# Patient Record
Sex: MALE | Race: WHITE | Employment: UNEMPLOYED | URBAN - NONMETROPOLITAN AREA
[De-identification: names, ages, dates, MRNs, and addresses within clinical notes are randomized per-mention and may not be internally consistent; named-entity substitution may affect disease eponyms.]

---

## 2020-10-13 ENCOUNTER — HOSPITAL ENCOUNTER (EMERGENCY)
Age: 75
Discharge: OTHER INSTITUTION | End: 2020-10-13
Attending: EMERGENCY MEDICINE
Payer: COMMERCIAL

## 2020-10-13 ENCOUNTER — APPOINTMENT (OUTPATIENT)
Dept: CT IMAGING | Age: 75
End: 2020-10-13
Payer: COMMERCIAL

## 2020-10-13 ENCOUNTER — APPOINTMENT (OUTPATIENT)
Dept: GENERAL RADIOLOGY | Age: 75
End: 2020-10-13
Payer: COMMERCIAL

## 2020-10-13 VITALS
RESPIRATION RATE: 15 BRPM | OXYGEN SATURATION: 98 % | BODY MASS INDEX: 24.92 KG/M2 | SYSTOLIC BLOOD PRESSURE: 119 MMHG | HEART RATE: 58 BPM | DIASTOLIC BLOOD PRESSURE: 77 MMHG | HEIGHT: 71 IN | WEIGHT: 178 LBS | TEMPERATURE: 97.4 F

## 2020-10-13 LAB
ALBUMIN SERPL-MCNC: 3.8 G/DL (ref 3.5–5.1)
ALP BLD-CCNC: 89 U/L (ref 38–126)
ALT SERPL-CCNC: 18 U/L (ref 11–66)
ANION GAP SERPL CALCULATED.3IONS-SCNC: 12 MEQ/L (ref 8–16)
APTT: 40.7 SECONDS (ref 22–38)
AST SERPL-CCNC: 21 U/L (ref 5–40)
BASOPHILS # BLD: 0.7 %
BASOPHILS ABSOLUTE: 0.1 THOU/MM3 (ref 0–0.1)
BILIRUB SERPL-MCNC: 0.2 MG/DL (ref 0.3–1.2)
BILIRUBIN URINE: NEGATIVE
BLOOD, URINE: NEGATIVE
BUN BLDV-MCNC: 37 MG/DL (ref 7–22)
CALCIUM SERPL-MCNC: 9.2 MG/DL (ref 8.5–10.5)
CHARACTER, URINE: CLEAR
CHLORIDE BLD-SCNC: 103 MEQ/L (ref 98–111)
CO2: 23 MEQ/L (ref 23–33)
COLOR: YELLOW
CREAT SERPL-MCNC: 1 MG/DL (ref 0.4–1.2)
EKG ATRIAL RATE: 59 BPM
EKG P AXIS: 76 DEGREES
EKG P-R INTERVAL: 196 MS
EKG Q-T INTERVAL: 462 MS
EKG QRS DURATION: 78 MS
EKG QTC CALCULATION (BAZETT): 457 MS
EKG R AXIS: 54 DEGREES
EKG T AXIS: 75 DEGREES
EKG VENTRICULAR RATE: 59 BPM
EOSINOPHIL # BLD: 5.2 %
EOSINOPHILS ABSOLUTE: 0.4 THOU/MM3 (ref 0–0.4)
ERYTHROCYTE [DISTWIDTH] IN BLOOD BY AUTOMATED COUNT: 14.2 % (ref 11.5–14.5)
ERYTHROCYTE [DISTWIDTH] IN BLOOD BY AUTOMATED COUNT: 48.6 FL (ref 35–45)
GFR SERPL CREATININE-BSD FRML MDRD: 73 ML/MIN/1.73M2
GLUCOSE BLD-MCNC: 114 MG/DL (ref 70–108)
GLUCOSE BLD-MCNC: 126 MG/DL (ref 70–108)
GLUCOSE, URINE: NEGATIVE MG/DL
HCT VFR BLD CALC: 37.7 % (ref 42–52)
HEMOGLOBIN: 12.2 GM/DL (ref 14–18)
IMMATURE GRANS (ABS): 0.11 THOU/MM3 (ref 0–0.07)
IMMATURE GRANULOCYTES: 1.3 %
INR BLD: 1.96 (ref 0.85–1.13)
KETONES, URINE: NEGATIVE
LEUKOCYTE EST, POC: NEGATIVE
LYMPHOCYTES # BLD: 33.9 %
LYMPHOCYTES ABSOLUTE: 2.9 THOU/MM3 (ref 1–4.8)
MCH RBC QN AUTO: 30.3 PG (ref 26–33)
MCHC RBC AUTO-ENTMCNC: 32.4 GM/DL (ref 32.2–35.5)
MCV RBC AUTO: 93.5 FL (ref 80–94)
MONOCYTES # BLD: 8.7 %
MONOCYTES ABSOLUTE: 0.7 THOU/MM3 (ref 0.4–1.3)
NITRITE, URINE: NEGATIVE
NUCLEATED RED BLOOD CELLS: 0 /100 WBC
OSMOLALITY CALCULATION: 285.2 MOSMOL/KG (ref 275–300)
PH UA: 5 (ref 5–9)
PLATELET # BLD: 108 THOU/MM3 (ref 130–400)
PMV BLD AUTO: 11.3 FL (ref 9.4–12.4)
POTASSIUM SERPL-SCNC: 4.1 MEQ/L (ref 3.5–5.2)
PROTEIN UA: NEGATIVE MG/DL
RBC # BLD: 4.03 MILL/MM3 (ref 4.7–6.1)
SEG NEUTROPHILS: 50.2 %
SEGMENTED NEUTROPHILS ABSOLUTE COUNT: 4.3 THOU/MM3 (ref 1.8–7.7)
SODIUM BLD-SCNC: 138 MEQ/L (ref 135–145)
SPECIFIC GRAVITY UA: 1.01 (ref 1–1.03)
TOTAL PROTEIN: 7.2 G/DL (ref 6.1–8)
TROPONIN T: < 0.01 NG/ML
UROBILINOGEN, URINE: 0.2 EU/DL (ref 0–1)
WBC # BLD: 8.6 THOU/MM3 (ref 4.8–10.8)

## 2020-10-13 PROCEDURE — 85610 PROTHROMBIN TIME: CPT

## 2020-10-13 PROCEDURE — 85025 COMPLETE CBC W/AUTO DIFF WBC: CPT

## 2020-10-13 PROCEDURE — 85730 THROMBOPLASTIN TIME PARTIAL: CPT

## 2020-10-13 PROCEDURE — 36415 COLL VENOUS BLD VENIPUNCTURE: CPT

## 2020-10-13 PROCEDURE — 80053 COMPREHEN METABOLIC PANEL: CPT

## 2020-10-13 PROCEDURE — 93005 ELECTROCARDIOGRAM TRACING: CPT | Performed by: EMERGENCY MEDICINE

## 2020-10-13 PROCEDURE — 84484 ASSAY OF TROPONIN QUANT: CPT

## 2020-10-13 PROCEDURE — 72125 CT NECK SPINE W/O DYE: CPT

## 2020-10-13 PROCEDURE — 81003 URINALYSIS AUTO W/O SCOPE: CPT

## 2020-10-13 PROCEDURE — 70450 CT HEAD/BRAIN W/O DYE: CPT

## 2020-10-13 PROCEDURE — APPSS180 APP SPLIT SHARED TIME > 60 MINUTES: Performed by: PHYSICIAN ASSISTANT

## 2020-10-13 PROCEDURE — 6820000002 HC L2 INJURY CALL ACTIVATION: Performed by: SURGERY

## 2020-10-13 PROCEDURE — 6370000000 HC RX 637 (ALT 250 FOR IP)

## 2020-10-13 PROCEDURE — 72170 X-RAY EXAM OF PELVIS: CPT

## 2020-10-13 PROCEDURE — 82948 REAGENT STRIP/BLOOD GLUCOSE: CPT

## 2020-10-13 PROCEDURE — 99285 EMERGENCY DEPT VISIT HI MDM: CPT

## 2020-10-13 RX ORDER — POTASSIUM CHLORIDE 7.45 MG/ML
10 INJECTION INTRAVENOUS ONCE
COMMUNITY

## 2020-10-13 RX ORDER — ACETAMINOPHEN 325 MG/1
650 TABLET ORAL EVERY 6 HOURS PRN
COMMUNITY

## 2020-10-13 RX ORDER — CALCIUM CARBONATE 200(500)MG
1 TABLET,CHEWABLE ORAL DAILY
COMMUNITY

## 2020-10-13 RX ORDER — FUROSEMIDE 20 MG/1
20 TABLET ORAL 2 TIMES DAILY
COMMUNITY

## 2020-10-13 RX ORDER — ACETAMINOPHEN 325 MG/1
TABLET ORAL
Status: COMPLETED
Start: 2020-10-13 | End: 2020-10-13

## 2020-10-13 RX ORDER — SOTALOL HYDROCHLORIDE 80 MG/1
80 TABLET ORAL 2 TIMES DAILY
COMMUNITY

## 2020-10-13 RX ORDER — PRAVASTATIN SODIUM 40 MG
40 TABLET ORAL DAILY
COMMUNITY

## 2020-10-13 RX ORDER — ACETAMINOPHEN 325 MG/1
650 TABLET ORAL ONCE
Status: COMPLETED | OUTPATIENT
Start: 2020-10-13 | End: 2020-10-13

## 2020-10-13 RX ORDER — ASPIRIN 81 MG/1
81 TABLET ORAL DAILY
COMMUNITY

## 2020-10-13 RX ADMIN — ACETAMINOPHEN 650 MG: 325 TABLET ORAL at 22:04

## 2020-10-13 ASSESSMENT — ENCOUNTER SYMPTOMS
WHEEZING: 0
ABDOMINAL PAIN: 0
STRIDOR: 0
FACIAL SWELLING: 1
ROS SKIN COMMENTS: RIGHT FOREHEAD ABRASION
NAUSEA: 0
EYE PAIN: 0
SHORTNESS OF BREATH: 0
BACK PAIN: 0
VOMITING: 0

## 2020-10-13 ASSESSMENT — PAIN SCALES - GENERAL: PAINLEVEL_OUTOF10: 4

## 2020-10-13 ASSESSMENT — PAIN DESCRIPTION - LOCATION: LOCATION: HEAD

## 2020-10-14 ASSESSMENT — ENCOUNTER SYMPTOMS
COLOR CHANGE: 0
PHOTOPHOBIA: 0
SORE THROAT: 0
NAUSEA: 0
ABDOMINAL PAIN: 0
COUGH: 0
BACK PAIN: 0
VOMITING: 0
EYE REDNESS: 0
SINUS PRESSURE: 0
CHEST TIGHTNESS: 0

## 2020-10-14 NOTE — ED NOTES
Gamalielib trauma PA at bedside stating CT scans are clear and this RN can take pt C collar off.       Sharad Brenner RN  10/13/20 4746

## 2020-10-14 NOTE — ED NOTES
Bed: 002A  Expected date: 10/13/20  Expected time: 7:53 PM  Means of arrival: [facility]  Comments:     Laura Hwang RN  10/13/20 2003

## 2020-10-14 NOTE — ED NOTES
Called and gave report to [facility] and all questions answered.       Rigo Ferrara RN  10/13/20 8589

## 2020-10-14 NOTE — ED NOTES
Pt stated he needed to pee. This RN and CO able to stand pt with two assist and pt urinated for sample. PT back in bed. No distress noted. RR even and unlabored.       Nataliia Oropeza RN  10/13/20 2126

## 2020-10-14 NOTE — ED NOTES
Pt comes to ED from [facility] [place] via EMS with c/o fall. EMS states pt had an unwitnessed fall. Pt states he was standing. Pt states he does not remember falling. Pt was found on the ground alert. 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 EMS states pt is on blood thinners. CO states pt is in the dementia unit and is dependable. They states pt uses walker. Pt states his had hurts. Pt is alert and oriented to name and SOB. PT is disoriented to time place and situation. CO states that pt uses a walker. Pt obeys commands. Pt has a hematoma and abrasion above his right eye. Pt right eye is non reactive. 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 Upon arrival EMS state pt is lethargic and GCS of 13. EMS states pt is leaning to the left.       Coleman Officer, RN  10/13/20 2057       Coleman Officer, RN  10/13/20 2101

## 2020-10-14 NOTE — ED NOTES
C collar removed and pt sat up. Pt vision is poor.  PT has residual stage of open angle glaucoma and side effects of TBI in his hx      Mitcheal Epley, RN  10/13/20 2112

## 2020-10-14 NOTE — ED NOTES
Pt resting in bed with HOB elevated. VS reassessed. RR reg. PT sates he has a little bit of HA. PT able to make eye contact with this RN. Updated pt on POC. PT verbalized understanding. Pt asked for something to drink.  Will continue to monitor      Carmen Esteves RN  10/13/20 2200

## 2020-10-14 NOTE — ED NOTES
Pt states he has already urinated and wet himself. Pt cleaned up and repositioned in bed while holding c spine.       Pascual Rojas RN  10/13/20 5635

## 2020-10-14 NOTE — CONSULTS
Trauma Surgery Consultation     Patient:  Osmar Vance date: 10/13/2020   YOB: 1945 Date of Evaluation: 10/13/2020  MRN: [id number]  Acct: [de-identified]    Injury Date: 10/13/20  Injury time:Evening  PCP: Giovana Moore MD   Referring physician: Dr. Arlet Stevenson    Time of Trauma Surgeon Notification: 19:52 on 10/13/20  Time of ZACHARY Arrival: 20:00 on 10/13/20  Time of Trauma Surgeon Arrival:  20:00 on 10/13/20    Assessment:    1. Fall  2. Closed head injury  3. Right forehead hematoma and abrasion  4. History of dementia  Plan:    CTs and plain film image reviewed. CT head negative for any acute intracranial hemorrhage. CT cervical spine negative for any acute fracture or dislocation. X-ray of the pelvis negative for any acute fracture or dislocation. FAST exam negative. No LOC reported. Labs reviewed, INR 1.96. Patient did not sustain any acute traumatic injuries that warrants admission under trauma surgery. Patient may be discharged back to snf from a trauma perspective. Discharge per ED physician. Patient seen and discussed with ED physician, Dr. Arlet Stevenson, and trauma surgeon, Dr. Nati Weaver. Activation: []Level I (Trauma Alert) [x]Level II (Injury Call) []Level III (Trauma Consult) [] Downgraded (Time: )   Mode of Arrival: EMS transportation  Referring Facility: none  Loss of Consciousness [x]No []Yes[]Unknown Duration(min)  Mechanism of Injury:  []Motor Vehicle crash   []Single Vehicle [] []Passenger []Scene Fatality []Front Seat  []Restrained   []Air Bag Deployed   []Ejected []Rollover []Pedestrian []Trapped   Type of vehicle:   Protective Devices:   []Motorcycle  Wearing Helmet []Yes []No  []Bicycle  Wearing Helmet []Yes []No  [x]Fall   Distance - standing   []Assault    Abuse Reported []Yes []No  []Gunshot  []Stabbing  []Work Related  []Burn: []Flame []Scald []Electrical []Chemical []Contact []Inhalation []House Fire  []Other:    There is no problem list on file for this patient. Subjective   Chief Complaint: Fall    History of Present Illness: Patient is a 72-year-old male who presents to [address] [place] [address] as an activation of a level 2 trauma following a fall. Patient is inmate at [facility]. Per report from EMS and [facility] guards fall was unwitnessed from standing but no loss of consciousness reported. Per report patient on Coumadin. [facility] guards endorsed a history of dementia and stated patient is acting at his baseline. Upon assessment patient's ABCs were intact, GCS 14, in no acute distress. Patient endorsed having a headache but denied any other pain or complaints. Patient denied having any lightheadedness, dizziness, nausea/vomiting, neck pain, back pain, chest pain, shortness of breath, abdominal pain, pain in the extremities, and paresthesias. On exam patient alert and oriented to self, confusion noted to place and time. Patient noted to have hematoma with overlying abrasions noted on the right side of his forehead above his right eye. Right pupil with irregular shape and black portion noted to superior aspect of right iris, left equal and reactive to light. Patient follows commands and moves all extremities. Distal pulses intact. Chest and abdomen nontender. FAST exam negative. C-collar in place. Patient with midline cervical tenderness to palpation. No midline thoracic or lumbar tenderness to palpation. No tenderness to palpation noted throughout extremities. Patient was stable and taken radiology for CT imaging. CTs and plain film image reviewed. CT head negative for any acute intracranial hemorrhage. CT cervical spine negative for any acute fracture or dislocation. X-ray of the pelvis negative for any acute fracture or dislocation. CT head did note 3mm calcification vs foreign body within subcutaneous tissues in location of right forehead hematoma and abrasion.  Wound reassessed, no foreign body palpated or visualized in Financial resource strain: Not on file    Food insecurity     Worry: Not on file     Inability: Not on file    Transportation needs     Medical: Not on file     Non-medical: Not on file   Tobacco Use    Smoking status: Not on file   Substance and Sexual Activity    Alcohol use: Not on file    Drug use: Not on file    Sexual activity: Not on file   Lifestyle    Physical activity     Days per week: Not on file     Minutes per session: Not on file    Stress: Not on file   Relationships    Social connections     Talks on phone: Not on file     Gets together: Not on file     Attends Zoroastrian service: Not on file     Active member of club or organization: Not on file     Attends meetings of clubs or organizations: Not on file     Relationship status: Not on file    Intimate partner violence     Fear of current or ex partner: Not on file     Emotionally abused: Not on file     Physically abused: Not on file     Forced sexual activity: Not on file   Other Topics Concern    Not on file   Social History Narrative    Not on file     History reviewed. No pertinent family history.     Home medications:    Previous Medications    ACETAMINOPHEN (TYLENOL) 325 MG TABLET    Take 650 mg by mouth every 6 hours as needed for Pain    ALBUTEROL IN    Inhale into the lungs    ASPIRIN 81 MG EC TABLET    Take 81 mg by mouth daily    CALCIUM CARBONATE (TUMS) 500 MG CHEWABLE TABLET    Take 1 tablet by mouth daily    FLUTICASONE-SALMETEROL (ADVAIR) 250-50 MCG/DOSE AEPB    Inhale 1 puff into the lungs every 12 hours    FUROSEMIDE (LASIX) 20 MG TABLET    Take 20 mg by mouth 2 times daily    METOPROLOL TARTRATE (LOPRESSOR) 25 MG TABLET    Take 25 mg by mouth 2 times daily    POTASSIUM CHLORIDE 10 MEQ/100ML    Infuse 10 mEq intravenously once    PRAVASTATIN (PRAVACHOL) 40 MG TABLET    Take 40 mg by mouth daily    SOTALOL (BETAPACE) 80 MG TABLET    Take 80 mg by mouth 2 times daily    WARFARIN SODIUM (COUMADIN PO)    Take by mouth Hospital medications:  Scheduled Meds:  Continuous Infusions:  PRN Meds:  Objective   ED TRIAGE VITALS  BP: 134/83, Temp: 97.4 °F (36.3 °C), Pulse: 57, Resp: 13, SpO2: 100 %  [place] Coma Scale  Eye Opening: Spontaneous  Best Verbal Response: Confused  Best Motor Response: Obeys commands  [place] Coma Scale Score: 14  No results found for this visit on 10/13/20. Physical Exam:  Patient Vitals for the past 24 hrs:   BP Temp Pulse Resp SpO2 Height Weight   10/13/20 2007 -- -- -- -- -- 5' 11\" (1.803 m) 178 lb (80.7 kg)   10/13/20 2006 -- 97.4 °F (36.3 °C) -- -- -- -- --   10/13/20 2004 134/83 -- 57 13 100 % -- --     Primary Assessment:  Airway: Patent, trachea midline  Breathing: Breath sounds present and equal bilaterally, spontaneous, and unlabored  Circulation: Hemodynamically stable, 2+ central and peripheral pulses. Disability: LORENZO x 4, following commands. GCS =14 (E4,V4,M6)    Secondary Assessment:  General: Alert, NAD, confusion noted, C-collar in place, cooperative with exam  Head: Normocephalic, mid face stable, Nares patent bilaterally, no epistaxis. Mouth clear of foreign bodies, no lacerations or abrasions. Hematoma and overlying abrasion noted to right side of forehead above right eye  Eyes: EOMI, Nontraumatic, Right pupil with irregular shape and black portion noted to superior aspect of right iris, left equal and reactive to light. Neurologic: A & O x1. Alert to self, confusion noted to place and time. Follows commands. CN 2-12 grossly intact. Neck: Immobilized in cervical collar, trachea midline. Cervical spines are tender to palpation midline, without step-offs, crepitus or deformity. Back:TL spines are NTTP midline, without step-offs, crepitus or deformity. No abrasions, contusions, or ecchymosis noted. Lungs: Clear to auscultation bilaterally. Chest Wall: Chest rise symmetrical.  Chest wall without tenderness to palpation. No crepitus, deformities, lacerations, or abrasions. Heart: RRR. Normal S1/S2. No obvious M/G/R. Abdomen:  Soft, NTTP. No guarding. Non-peritoneal.  Pelvis:  NTTP, stable to compression. Extremities: No gross deformities. PMS intact. Radial /DP/PT pulses 2+ bilaterally. No tenderness to palpation noted throughout extremities. Moves all extremities. Skin: Skin warm and dry. Normal for ethnicity. Radiology:     XR PELVIS (1-2 VIEWS)   Final Result   1. No acute findings. This document has been electronically signed by: Hollie Salinas MD on    10/13/2020 09:07 PM         CT HEAD WO CONTRAST   Final Result   No acute intracranial findings. Right frontal scalp hematoma. **This report has been created using voice recognition software. It may contain minor errors which are inherent in voice recognition technology. **      Final report electronically signed by Dr. Lashaun Cortez on 10/13/2020 8:35 PM      CT CERVICAL SPINE WO CONTRAST   Final Result    IMPRESSION:   No acute fracture or subluxation. **This report has been created using voice recognition software. It may contain minor errors which are inherent in voice recognition technology. **      Final report electronically signed by Dr. Lashaun Cortez on 10/13/2020 8:41 PM        Fast Exam: Yes    FAST EXAM:  A limited, bedside FAST exam was performed. The medical necessity was to evaluate for the presence or absence of intraperitoneal or pericardial fluid. The structures studied were the hepatorenal space, splenorenal space, pericardium, and bladder. FINDINGS:  negative for free intra-abdominal fluid. The study was technically adequate. FAST performed by medical student under supervision of myself and Dr. Su Gracia    Electronically signed by Devin Martinez PA-C on 10/13/2020 at 8:12 PM Patient seen and examined independently by me. Above discussed and I agree with CNP. Labs, cultures, and radiographs where available were reviewed.    See orders for the updated patient care Regino Aase MD, this was a level 2 trauma consult time called was 7:53 PM time seen was 8:00 PM this was a trauma by fall on anticoagulants 47-year-old white male prisoner at [address] on Coumadin also apparently with baseline dementia who had an unwitnessed fall patient was awake but did not know what it happened to him when asked if he knew where he was he said no however neurologically he appeared intact his only obvious injury was an abrasion hematoma to the right superior orbital area patient's abdomen was soft with no other evidence of injuries well-healed midline incisions and right paramedian incision lower quadrant were noted work-up with CT scans of the head and neck were negative okay for patient to be discharged back to the present  10/13/2020   11:25 PM

## 2020-10-15 NOTE — ED PROVIDER NOTES
[address] [address]          Pt Name: Jessica Aponte  MRN: [id number]  [place] 1945  Date of evaluation: 10/13/2020  Emergency Physician: Savanah Carlisle DO    CHIEF COMPLAINT       Chief Complaint   Patient presents with    Fall     History obtained from unobtainable from patient due to dementia. HISTORY OF PRESENT ILLNESS    HPI  Jessica Aponte is a 70 y.o. male who presents to the emergency department for evaluation of fall. Patient was a level 2 trauma activation per EMS criteria. Patient is an unknown at [facility]. Per the retirement guards patient had an unwitnessed fall from standing no loss of consciousness. He was noted to have a cephalohematoma. And a frontal abrasion. Aj Lea He was on Coumadin. Patient has a history of dementia and is acting his baseline per report from guards. Patient does not recall events of his fall he states he fell. Chest pain denies shortness of breath denies nausea denies vomiting. Currently he reports mild headache. The patient has no other acute complaints at this time. REVIEW OF SYSTEMS   Review of Systems   Unable to perform ROS: Dementia   Constitutional: Negative for activity change, chills and fever. HENT: Negative for congestion, sinus pressure and sore throat. Eyes: Negative for photophobia, redness and visual disturbance. Respiratory: Negative for cough and chest tightness. Cardiovascular: Negative for chest pain, palpitations and leg swelling. Gastrointestinal: Negative for abdominal pain, nausea and vomiting. Endocrine: Negative for polydipsia and polyuria. Genitourinary: Negative for decreased urine volume, difficulty urinating, dysuria, flank pain, frequency and urgency. Musculoskeletal: Negative for back pain, myalgias and neck pain. Skin: Negative for color change and rash. Neurological: Negative for weakness and headaches.    Hematological: Negative for adenopathy. Does not bruise/bleed easily. Psychiatric/Behavioral: Negative for confusion and self-injury. PAST MEDICAL AND SURGICAL HISTORY     Past Medical History:   Diagnosis Date    Asthma     Atrial fibrillation (HCC)     Cellulitis     Chronic airway obstruction (HCC)     Diverticulosis     Fibromatosis     Glaucoma     Hip joint pain     Hyperlipidemia     Hypertension     Osteoarthritis     TBI (traumatic brain injury) ([facility] [address]     Tinea corporis      History reviewed. No pertinent surgical history. MEDICATIONS   No current facility-administered medications for this encounter.      Current Outpatient Medications:     calcium carbonate (TUMS) 500 MG chewable tablet, Take 1 tablet by mouth daily, Disp: , Rfl:     ALBUTEROL IN, Inhale into the lungs, Disp: , Rfl:     acetaminophen (TYLENOL) 325 MG tablet, Take 650 mg by mouth every 6 hours as needed for Pain, Disp: , Rfl:     furosemide (LASIX) 20 MG tablet, Take 20 mg by mouth 2 times daily, Disp: , Rfl:     metoprolol tartrate (LOPRESSOR) 25 MG tablet, Take 25 mg by mouth 2 times daily, Disp: , Rfl:     aspirin 81 MG EC tablet, Take 81 mg by mouth daily, Disp: , Rfl:     pravastatin (PRAVACHOL) 40 MG tablet, Take 40 mg by mouth daily, Disp: , Rfl:     sotalol (BETAPACE) 80 MG tablet, Take 80 mg by mouth 2 times daily, Disp: , Rfl:     potassium chloride 10 MEQ/100ML, Infuse 10 mEq intravenously once, Disp: , Rfl:     fluticasone-salmeterol (ADVAIR) 250-50 MCG/DOSE AEPB, Inhale 1 puff into the lungs every 12 hours, Disp: , Rfl:     Warfarin Sodium (COUMADIN PO), Take by mouth, Disp: , Rfl:       SOCIAL HISTORY     Social History     Social History Narrative    Not on file     Social History     Tobacco Use    Smoking status: Former Smoker    Smokeless tobacco: Never Used   Substance Use Topics    Alcohol use: Not Currently    Drug use: Never         ALLERGIES   No Known Allergies      FAMILY HISTORY History reviewed. No pertinent family history. PHYSICAL EXAM     ED Triage Vitals   BP Temp Temp src Pulse Resp SpO2 Height Weight   10/13/20 2004 10/13/20 2006 -- 10/13/20 2004 10/13/20 2004 10/13/20 2004 10/13/20 2007 10/13/20 2007   134/83 97.4 °F (36.3 °C)  57 13 100 % 5' 11\" (1.803 m) 178 lb (80.7 kg)         Additional Vital Signs:  Vitals:    10/13/20 2243   BP: 119/77   Pulse: 58   Resp: 15   Temp:    SpO2: 98%       Physical Exam  Vitals signs and nursing note reviewed. Constitutional:       General: He is not in acute distress. Appearance: He is well-developed. He is not diaphoretic. HENT:      Head: Normocephalic. Comments: Right frontal cephalohematoma with abrasion     Right Ear: Tympanic membrane and ear canal normal.      Left Ear: Tympanic membrane and ear canal normal.      Mouth/Throat:      Mouth: Mucous membranes are dry. Eyes:      Extraocular Movements: Extraocular movements intact. Pupils: Pupils are equal, round, and reactive to light. Comments: Right pupil with chronic iridotomy scar   Neck:      Musculoskeletal: Normal range of motion and neck supple. Vascular: No JVD. Cardiovascular:      Rate and Rhythm: Normal rate and regular rhythm. Heart sounds: Normal heart sounds. Pulmonary:      Effort: Pulmonary effort is normal.      Breath sounds: Normal breath sounds. Abdominal:      General: Bowel sounds are normal. There is no distension. Palpations: Abdomen is soft. Tenderness: There is no abdominal tenderness. Musculoskeletal: Normal range of motion. General: No tenderness or deformity. Skin:     General: Skin is warm and dry. Capillary Refill: Capillary refill takes less than 2 seconds. Neurological:      Mental Status: He is alert and oriented to person, place, and time. GCS: GCS eye subscore is 4. GCS verbal subscore is 5. GCS motor subscore is 6. Cranial Nerves: No cranial nerve deficit.       Sensory: findings. This document has been electronically signed by: Eduardo Rashid MD on    10/13/2020 09:07 PM         CT HEAD WO CONTRAST   Final Result   No acute intracranial findings. Right frontal scalp hematoma. **This report has been created using voice recognition software. It may contain minor errors which are inherent in voice recognition technology. **      Final report electronically signed by Dr. Nilay Atwood on 10/13/2020 8:35 PM      CT CERVICAL SPINE WO CONTRAST   Final Result    IMPRESSION:   No acute fracture or subluxation. **This report has been created using voice recognition software. It may contain minor errors which are inherent in voice recognition technology. **      Final report electronically signed by Dr. Nilay Atwood on 10/13/2020 8:41 PM          ED Medications administered this visit:   Medications   acetaminophen (TYLENOL) tablet 650 mg (650 mg Oral Given 10/13/20 2204)         ED COURSE      Patient was seen in consultation with trauma services. He was found to have an isolated cephalhematoma and abrasion. CT imaging of head and neck were negative. Pelvis x-ray was negative for fracture. Patient remains afebrile with stable vital signs. Blood counts are within acceptable range as well are his his troponin and ECG. He is discharged back to the care home in stable condition. Given head injury return precautions        MEDICATION CHANGES     DISCHARGE MEDICATIONS:  Discharge Medication List as of 10/13/2020 10:13 PM               FINAL DISPOSITION     Final diagnoses:   Injury of head, initial encounter   Fall, initial encounter   Facial abrasion, initial encounter     Condition: condition: good  Dispo: Discharge to care home    PATIENT REFERRED TO:  SCHUYLER Wilson - CNP  [address]  [address]  [phone]    Schedule an appointment as soon as possible for a visit in 2 days          This transcription was electronically signed.  Parts of this transcriptions may have been dictated by use of voice recognition software and electronically transcribed, and parts may have been transcribed with the assistance of an ED scribe. The transcription may contain errors not detected in proofreading.     Electronically Signed: Mark Patel, 10/14/20, 9:06 PM      Mark Patel DO  10/14/20 0406

## 2020-11-11 ENCOUNTER — HOSPITAL ENCOUNTER (EMERGENCY)
Age: 75
Discharge: ANOTHER HOSPITAL | End: 2020-11-11
Payer: COMMERCIAL

## 2020-11-11 ENCOUNTER — APPOINTMENT (OUTPATIENT)
Dept: GENERAL RADIOLOGY | Age: 75
End: 2020-11-11
Payer: COMMERCIAL

## 2020-11-11 VITALS
SYSTOLIC BLOOD PRESSURE: 96 MMHG | HEART RATE: 72 BPM | RESPIRATION RATE: 18 BRPM | BODY MASS INDEX: 25.1 KG/M2 | OXYGEN SATURATION: 96 % | TEMPERATURE: 99.8 F | DIASTOLIC BLOOD PRESSURE: 59 MMHG | WEIGHT: 180 LBS

## 2020-11-11 LAB
ALBUMIN SERPL-MCNC: 3.5 G/DL (ref 3.5–5.1)
ALP BLD-CCNC: 89 U/L (ref 38–126)
ALT SERPL-CCNC: 29 U/L (ref 11–66)
ANION GAP SERPL CALCULATED.3IONS-SCNC: 14 MEQ/L (ref 8–16)
AST SERPL-CCNC: 81 U/L (ref 5–40)
ATYPICAL LYMPHOCYTES: ABNORMAL %
BASE EXCESS MIXED: 0 MMOL/L (ref -2–3)
BASOPHILS # BLD: 0.1 %
BASOPHILS # BLD: 0.3 %
BASOPHILS ABSOLUTE: 0 THOU/MM3 (ref 0–0.1)
BASOPHILS ABSOLUTE: 0 THOU/MM3 (ref 0–0.1)
BILIRUB SERPL-MCNC: 0.4 MG/DL (ref 0.3–1.2)
BILIRUBIN DIRECT: < 0.2 MG/DL (ref 0–0.3)
BUN BLDV-MCNC: 57 MG/DL (ref 7–22)
C-REACTIVE PROTEIN: 8.53 MG/DL (ref 0–1)
CALCIUM SERPL-MCNC: 9.2 MG/DL (ref 8.5–10.5)
CHLORIDE BLD-SCNC: 106 MEQ/L (ref 98–111)
CO2: 26 MEQ/L (ref 23–33)
COLLECTED BY:: ABNORMAL
CREAT SERPL-MCNC: 1.4 MG/DL (ref 0.4–1.2)
DEVICE: ABNORMAL
EKG ATRIAL RATE: 78 BPM
EKG P AXIS: 63 DEGREES
EKG P-R INTERVAL: 150 MS
EKG Q-T INTERVAL: 400 MS
EKG QRS DURATION: 70 MS
EKG QTC CALCULATION (BAZETT): 456 MS
EKG R AXIS: 47 DEGREES
EKG T AXIS: 56 DEGREES
EKG VENTRICULAR RATE: 78 BPM
EOSINOPHIL # BLD: 0 %
EOSINOPHIL # BLD: 0 %
EOSINOPHILS ABSOLUTE: 0 THOU/MM3 (ref 0–0.4)
EOSINOPHILS ABSOLUTE: 0 THOU/MM3 (ref 0–0.4)
ERYTHROCYTE [DISTWIDTH] IN BLOOD BY AUTOMATED COUNT: 14.6 % (ref 11.5–14.5)
ERYTHROCYTE [DISTWIDTH] IN BLOOD BY AUTOMATED COUNT: 14.6 % (ref 11.5–14.5)
ERYTHROCYTE [DISTWIDTH] IN BLOOD BY AUTOMATED COUNT: 50.3 FL (ref 35–45)
ERYTHROCYTE [DISTWIDTH] IN BLOOD BY AUTOMATED COUNT: 50.5 FL (ref 35–45)
FERRITIN: 1141 NG/ML (ref 22–322)
FIO2, MIXED VENOUS: 2
GFR SERPL CREATININE-BSD FRML MDRD: 50 ML/MIN/1.73M2
GLUCOSE BLD-MCNC: 124 MG/DL (ref 70–108)
HCO3, MIXED: 24 MMOL/L (ref 23–28)
HCT VFR BLD CALC: 46.2 % (ref 42–52)
HCT VFR BLD CALC: 47.2 % (ref 42–52)
HEMOGLOBIN: 14.6 GM/DL (ref 14–18)
HEMOGLOBIN: 15.2 GM/DL (ref 14–18)
IMMATURE GRANS (ABS): 0.02 THOU/MM3 (ref 0–0.07)
IMMATURE GRANS (ABS): 0.04 THOU/MM3 (ref 0–0.07)
IMMATURE GRANULOCYTES: 0.3 %
IMMATURE GRANULOCYTES: 0.6 %
INR BLD: 2.88 (ref 0.85–1.13)
INR BLD: 3 (ref 0.85–1.13)
LACTIC ACID: 1.4 MMOL/L (ref 0.5–2.2)
LD: 442 U/L (ref 100–190)
LIPASE: 183.9 U/L (ref 5.6–51.3)
LYMPHOCYTES # BLD: 31.3 %
LYMPHOCYTES # BLD: 33 %
LYMPHOCYTES ABSOLUTE: 2.4 THOU/MM3 (ref 1–4.8)
LYMPHOCYTES ABSOLUTE: 2.4 THOU/MM3 (ref 1–4.8)
MCH RBC QN AUTO: 29.9 PG (ref 26–33)
MCH RBC QN AUTO: 30 PG (ref 26–33)
MCHC RBC AUTO-ENTMCNC: 31.6 GM/DL (ref 32.2–35.5)
MCHC RBC AUTO-ENTMCNC: 32.2 GM/DL (ref 32.2–35.5)
MCV RBC AUTO: 93.1 FL (ref 80–94)
MCV RBC AUTO: 94.5 FL (ref 80–94)
MONOCYTES # BLD: 7 %
MONOCYTES # BLD: 7.3 %
MONOCYTES ABSOLUTE: 0.5 THOU/MM3 (ref 0.4–1.3)
MONOCYTES ABSOLUTE: 0.5 THOU/MM3 (ref 0.4–1.3)
NUCLEATED RED BLOOD CELLS: 0 /100 WBC
NUCLEATED RED BLOOD CELLS: 0 /100 WBC
O2 SAT, MIXED: 52 %
OSMOLALITY CALCULATION: 307.8 MOSMOL/KG (ref 275–300)
PCO2, MIXED VENOUS: 38 MMHG (ref 41–51)
PH, MIXED: 7.42 (ref 7.31–7.41)
PLATELET # BLD: 94 THOU/MM3 (ref 130–400)
PLATELET # BLD: 98 THOU/MM3 (ref 130–400)
PLATELET ESTIMATE: ABNORMAL
PMV BLD AUTO: 11.2 FL (ref 9.4–12.4)
PMV BLD AUTO: 12 FL (ref 9.4–12.4)
PO2 MIXED: 27 MMHG (ref 25–40)
POTASSIUM REFLEX MAGNESIUM: 4.1 MEQ/L (ref 3.5–5.2)
PRO-BNP: 56.8 PG/ML (ref 0–900)
RBC # BLD: 4.89 MILL/MM3 (ref 4.7–6.1)
RBC # BLD: 5.07 MILL/MM3 (ref 4.7–6.1)
REASON FOR REJECTION: NORMAL
REJECTED TEST: NORMAL
SARS-COV-2, NAAT: DETECTED
SCAN OF BLOOD SMEAR: NORMAL
SEG NEUTROPHILS: 59 %
SEG NEUTROPHILS: 61.1 %
SEGMENTED NEUTROPHILS ABSOLUTE COUNT: 4.3 THOU/MM3 (ref 1.8–7.7)
SEGMENTED NEUTROPHILS ABSOLUTE COUNT: 4.6 THOU/MM3 (ref 1.8–7.7)
SODIUM BLD-SCNC: 146 MEQ/L (ref 135–145)
TOTAL PROTEIN: 7.3 G/DL (ref 6.1–8)
TROPONIN T: < 0.01 NG/ML
WBC # BLD: 7.3 THOU/MM3 (ref 4.8–10.8)
WBC # BLD: 7.6 THOU/MM3 (ref 4.8–10.8)

## 2020-11-11 PROCEDURE — 86140 C-REACTIVE PROTEIN: CPT

## 2020-11-11 PROCEDURE — 2700000000 HC OXYGEN THERAPY PER DAY

## 2020-11-11 PROCEDURE — 83690 ASSAY OF LIPASE: CPT

## 2020-11-11 PROCEDURE — 85025 COMPLETE CBC W/AUTO DIFF WBC: CPT

## 2020-11-11 PROCEDURE — 80076 HEPATIC FUNCTION PANEL: CPT

## 2020-11-11 PROCEDURE — 71045 X-RAY EXAM CHEST 1 VIEW: CPT

## 2020-11-11 PROCEDURE — 93010 ELECTROCARDIOGRAM REPORT: CPT | Performed by: NUCLEAR MEDICINE

## 2020-11-11 PROCEDURE — U0002 COVID-19 LAB TEST NON-CDC: HCPCS

## 2020-11-11 PROCEDURE — 83615 LACTATE (LD) (LDH) ENZYME: CPT

## 2020-11-11 PROCEDURE — 94761 N-INVAS EAR/PLS OXIMETRY MLT: CPT

## 2020-11-11 PROCEDURE — 93005 ELECTROCARDIOGRAM TRACING: CPT | Performed by: NURSE PRACTITIONER

## 2020-11-11 PROCEDURE — 80048 BASIC METABOLIC PNL TOTAL CA: CPT

## 2020-11-11 PROCEDURE — 36415 COLL VENOUS BLD VENIPUNCTURE: CPT

## 2020-11-11 PROCEDURE — 83880 ASSAY OF NATRIURETIC PEPTIDE: CPT

## 2020-11-11 PROCEDURE — 83605 ASSAY OF LACTIC ACID: CPT

## 2020-11-11 PROCEDURE — 84484 ASSAY OF TROPONIN QUANT: CPT

## 2020-11-11 PROCEDURE — 82728 ASSAY OF FERRITIN: CPT

## 2020-11-11 PROCEDURE — 6370000000 HC RX 637 (ALT 250 FOR IP): Performed by: NURSE PRACTITIONER

## 2020-11-11 PROCEDURE — 85610 PROTHROMBIN TIME: CPT

## 2020-11-11 PROCEDURE — 99285 EMERGENCY DEPT VISIT HI MDM: CPT

## 2020-11-11 PROCEDURE — 82803 BLOOD GASES ANY COMBINATION: CPT

## 2020-11-11 PROCEDURE — 2580000003 HC RX 258: Performed by: NURSE PRACTITIONER

## 2020-11-11 RX ORDER — SODIUM CHLORIDE 9 MG/ML
1000 INJECTION, SOLUTION INTRAVENOUS CONTINUOUS
Status: DISCONTINUED | OUTPATIENT
Start: 2020-11-11 | End: 2020-11-12 | Stop reason: HOSPADM

## 2020-11-11 RX ORDER — ACETAMINOPHEN 500 MG
1000 TABLET ORAL ONCE
Status: COMPLETED | OUTPATIENT
Start: 2020-11-11 | End: 2020-11-11

## 2020-11-11 RX ORDER — ACETAMINOPHEN 500 MG
TABLET ORAL
Status: DISCONTINUED
Start: 2020-11-11 | End: 2020-11-12 | Stop reason: HOSPADM

## 2020-11-11 RX ADMIN — SODIUM CHLORIDE 1000 ML: 9 INJECTION, SOLUTION INTRAVENOUS at 19:52

## 2020-11-11 RX ADMIN — ACETAMINOPHEN 1000 MG: 500 TABLET ORAL at 16:09

## 2020-11-11 ASSESSMENT — PAIN SCALES - GENERAL: PAINLEVEL_OUTOF10: 0

## 2020-11-11 NOTE — ED PROVIDER NOTES
potassium chloride 10 MEQ/100ML Infuse 10 mEq intravenously onceHistorical Med      fluticasone-salmeterol (ADVAIR) 250-50 MCG/DOSE AEPB Inhale 1 puff into the lungs every 12 hoursHistorical Med      Warfarin Sodium (COUMADIN PO) Take by mouth             ALLERGIES     has No Known Allergies. FAMILY HISTORY     has no family status information on file. family history is not on file. SOCIAL HISTORY      reports that he has quit smoking. He has never used smokeless tobacco. He reports previous alcohol use. He reports that he does not use drugs. PHYSICAL EXAM     INITIAL VITALS:  weight is 180 lb (81.6 kg). His oral temperature is 99.8 °F (37.7 °C). His blood pressure is 96/59 (abnormal) and his pulse is 72. His respiration is 18 and oxygen saturation is 96%. Physical Exam  Vitals signs and nursing note reviewed. Constitutional:       General: He is awake. He is not in acute distress. Appearance: Normal appearance. He is well-developed and normal weight. He is ill-appearing and diaphoretic. He is not toxic-appearing. HENT:      Head: Normocephalic and atraumatic. Nose: Nose normal.      Mouth/Throat:      Mouth: Mucous membranes are moist.      Pharynx: Oropharynx is clear. Eyes:      Extraocular Movements: Extraocular movements intact. Pupils: Pupils are equal, round, and reactive to light. Neck:      Musculoskeletal: Normal range of motion and neck supple. No neck rigidity or muscular tenderness. Cardiovascular:      Rate and Rhythm: Normal rate and regular rhythm. No extrasystoles are present. Pulses: Normal pulses. Heart sounds: Normal heart sounds, S1 normal and S2 normal. Heart sounds not distant. No murmur. No friction rub. No gallop. Pulmonary:      Effort: Pulmonary effort is normal. No tachypnea, bradypnea, accessory muscle usage, prolonged expiration, respiratory distress or retractions. Breath sounds: Normal breath sounds. No stridor.  No wheezing, rhonchi or rales. Chest:      Chest wall: No tenderness. Abdominal:      General: Abdomen is flat. Bowel sounds are normal. There is no distension. Palpations: Abdomen is soft. There is no shifting dullness, hepatomegaly, splenomegaly or mass. Tenderness: There is no abdominal tenderness. Hernia: No hernia is present. Musculoskeletal: Normal range of motion. General: No swelling, tenderness, deformity or signs of injury. Right lower leg: No edema. Left lower leg: No edema. Skin:     General: Skin is warm. Capillary Refill: Capillary refill takes less than 2 seconds. Coloration: Skin is not jaundiced or pale. Neurological:      General: No focal deficit present. Mental Status: He is alert and oriented to person, place, and time. Mental status is at baseline. GCS: GCS eye subscore is 4. GCS verbal subscore is 5. GCS motor subscore is 6. Cranial Nerves: Cranial nerves are intact. Sensory: Sensation is intact. Psychiatric:         Mood and Affect: Mood normal.         Behavior: Behavior normal. Behavior is cooperative. DIFFERENTIAL DIAGNOSIS:   COVID-19 infection, pneumonia, hypoxia, hypercapnia, sepsis    DIAGNOSTIC RESULTS     EKG: All EKG's are interpreted by the Emergency Department Physician who either signs or Co-signs this chart in the absence of a cardiologist.    Sinus rhythm with rate of 78, , QRS of 70, QTc 456. Compared with EKG from October 13, 2020 PACs now present    EKG interpreted by ED physician  RADIOLOGY: non-plainfilm images(s) such as CT, Ultrasound and MRI are read by the radiologist.    XR CHEST PORTABLE   Final Result   1. Moderate cardiomegaly. No effusion. 2. Moderate groundglass infiltrates scattered diffusely in the right lung and the left middle lower lung fields, consistent with Covid infection/pneumonia. **This report has been created using voice recognition software.   It may the following components:    SARS-CoV-2, NAAT DETECTED (*)     All other components within normal limits   GLOMERULAR FILTRATION RATE, ESTIMATED - Abnormal; Notable for the following components:    Est, Glom Filt Rate 50 (*)     All other components within normal limits   OSMOLALITY - Abnormal; Notable for the following components:    Osmolality Calc 307.8 (*)     All other components within normal limits   TROPONIN   BRAIN NATRIURETIC PEPTIDE   LACTIC ACID, PLASMA   SPECIMEN REJECTION   SCAN OF BLOOD SMEAR   ANION GAP   COVID-19   COVID-19       EMERGENCY DEPARTMENT COURSE:   Vitals:    Vitals:    11/11/20 2055 11/11/20 2215 11/11/20 2300 11/11/20 2350   BP: 121/84 111/69 104/69 (!) 96/59   Pulse: 85 78 75 72   Resp: 20 18 18 18   Temp:       TempSrc:       SpO2: 95% 96% 95% 96%   Weight:           3:55 PM EST: The patient was seen and evaluated. MDM:  Patient seen and evaluated today for hypoxia at present. 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 Appropriate labs were ordered, patient found to be hypoxic on arrival.  COVID-19 swab found to be positive. Patient required supplemental oxygen to maintain saturations. Vital signs noted to improve throughout ED stay. Patient noted to be mildly hypotensive however pressures improved with small amount of fluids. Groundglass infiltrates noted on chest x-ray. Other labs consistent with Covid infection. Discussed the case with the [facility] transfer center, patient will be accepted as an ED to ED transfer. CRITICAL CARE:   None    CONSULTS:  [facility] transfer line    PROCEDURES:  None    FINAL IMPRESSION      1. COVID-19          DISPOSITION/PLAN   Transfer to [facility] care home unit    PATIENT REFERRED TO:  No follow-up provider specified.     DISCHARGE MEDICATIONS:  Discharge Medication List as of 11/12/2020 12:04 AM          (Please note that portions of this note were completed with a voice recognition program.  Efforts were made to edit the dictations but occasionally words are mis-transcribed.)    The patient was given an opportunity to see the Emergency Attending. The patient voiced understanding that I was a Mid-LevelProvider and was in agreement with being seen independently by myself.      SCHUYLER Rojas - CNP, 11/11/20, 5:21 AM       SCHUYLER Rojas CNP  11/20/20 3640

## 2020-11-11 NOTE — ACP (ADVANCE CARE PLANNING)
between Advance Directives and portable DNR orders. Length of ACP Conversation in minutes:  10    Conversation Outcomes:  [x] ACP discussion completed  [] Existing advance directive reviewed with patient; no changes to patient's previously recorded wishes  [] New Advance Directive completed  [] Portable Do Not Rescitate prepared for Provider review and signature  [] POLST/POST/MOLST/MOST prepared for Provider review and signature      Follow-up plan:    [] Schedule follow-up conversation to continue planning  [] Referred individual to Provider for additional questions/concerns   [] Advised patient/agent/surrogate to review completed ACP document and update if needed with changes in condition, patient preferences or care setting    [] This note routed to one or more involved healthcare providers     - Patient is a dementia patient from the [facility]. This staff was told he is a Full Code and will remain that until his death.  If admission is suggested, he will be transferred to [address] [place] [address]

## 2020-11-11 NOTE — ED NOTES
Bed: 019A  Expected date:   Expected time:   Means of arrival: [facility]  Comments:     Sandy Mariee RN  11/11/20 3553

## 2020-11-12 NOTE — ED NOTES
LACP calls at this time and states possible ETA of 0000.        Juan [facility]  11/11/20 5846

## 2020-11-12 NOTE — ED NOTES
Pt resting in bed with call light in reach and police observation outside door with curtain open. Pt states no further needs at this time. No distress noted at this time. Pt has cuff applied to lower extremity at this time due to coming from incarceration.        Juan, [facility]  11/11/20 1954

## 2020-11-12 NOTE — ED NOTES
Pt resting in bed with call light in reach and police observation at bedside outside door with door closed and curtain open for observation. Pt states no further needs upon assessment and is alert to voice when spoken to. No distress noted at this time.        [facility]  11/11/20 1805

## 2020-11-12 NOTE — ED NOTES
Pt states no further needs upon assessment. Pt alert to voice at this time and states \"yea\", when questioned if pt is doing okay, states \"no\" when asked if pt needs anything else. No distress noted. Pt remains cuffed to bed with police observation outside exam room with door closed for isolation with curtain open.        Juan, [address] [place] [address]  11/11/20 2880

## 2020-11-14 NOTE — ED PROVIDER NOTES
[address]     Pt Name: El Pope  MRN: [id number]  [place] 1945  Date of evaluation: 11/13/20        Mid-level provider Note:    I have personally performed and/or participated in the history, exam and medical decision making and agree with all pertinent clinical information as noted by the previous provider. I have also reviewed and agree with the past medical, family and social history unless otherwise noted. I have personally performed a face to face diagnostic evaluation on this patient. I have reviewed the previous provider's findings and agree. Evaluation: I assumed care of this patient from [address]. At the time of my takeover, patient had been accepted for transfer to [facility]. His work-up was complete. He required nothing for me. He remained stable throughout the rest of his visit. He was transferred without incident. Please refer to Francesco's notes for further evaluation. Xr Chest Portable    Result Date: 11/11/2020  PROCEDURE: XR CHEST PORTABLE CLINICAL INFORMATION: Shortness of breath, COVID + COMPARISON: 9/7/2015 TECHNIQUE: A single mobile view of the chest was obtained. 1. Moderate cardiomegaly. No effusion. 2. Moderate groundglass infiltrates scattered diffusely in the right lung and the left middle lower lung fields, consistent with Covid infection/pneumonia. **This report has been created using voice recognition software. It may contain minor errors which are inherent in voice recognition technology. ** Final report electronically signed by Dr. Elroy Arango on 11/11/2020 4:46 PM        DIAGNOSIS  1. COVID-19           DISPOSITION/PLAN  PATIENT REFERRED TO:  No follow-up provider specified.   DISCHARGE MEDICATIONS:  Discharge Medication List as of 11/12/2020 12:04 AM            SCHUYLER Ruiz CNP, APRN - CNP  11/13/20 8255